# Patient Record
Sex: MALE | Race: WHITE | NOT HISPANIC OR LATINO | Employment: FULL TIME | URBAN - METROPOLITAN AREA
[De-identification: names, ages, dates, MRNs, and addresses within clinical notes are randomized per-mention and may not be internally consistent; named-entity substitution may affect disease eponyms.]

---

## 2024-08-13 ENCOUNTER — OFFICE VISIT (OUTPATIENT)
Dept: URGENT CARE | Facility: CLINIC | Age: 29
End: 2024-08-13
Payer: COMMERCIAL

## 2024-08-13 VITALS
OXYGEN SATURATION: 97 % | SYSTOLIC BLOOD PRESSURE: 120 MMHG | TEMPERATURE: 98 F | BODY MASS INDEX: 27.54 KG/M2 | DIASTOLIC BLOOD PRESSURE: 65 MMHG | HEART RATE: 45 BPM | WEIGHT: 238 LBS | RESPIRATION RATE: 18 BRPM | HEIGHT: 78 IN

## 2024-08-13 DIAGNOSIS — K52.9 GASTROENTERITIS: Primary | ICD-10-CM

## 2024-08-13 DIAGNOSIS — R19.7 DIARRHEA, UNSPECIFIED TYPE: ICD-10-CM

## 2024-08-13 DIAGNOSIS — R11.0 NAUSEA: ICD-10-CM

## 2024-08-13 PROCEDURE — 99203 OFFICE O/P NEW LOW 30 MIN: CPT | Mod: S$GLB,,,

## 2024-08-13 RX ORDER — ONDANSETRON 4 MG/1
4 TABLET, ORALLY DISINTEGRATING ORAL EVERY 6 HOURS PRN
Qty: 16 TABLET | Refills: 0 | Status: SHIPPED | OUTPATIENT
Start: 2024-08-13 | End: 2024-08-13

## 2024-08-13 RX ORDER — FAMOTIDINE 20 MG/1
20 TABLET, FILM COATED ORAL 2 TIMES DAILY
Qty: 14 TABLET | Refills: 0 | Status: SHIPPED | OUTPATIENT
Start: 2024-08-13 | End: 2024-08-13

## 2024-08-13 RX ORDER — ONDANSETRON 4 MG/1
4 TABLET, ORALLY DISINTEGRATING ORAL EVERY 6 HOURS PRN
Qty: 16 TABLET | Refills: 0 | Status: SHIPPED | OUTPATIENT
Start: 2024-08-13 | End: 2024-08-17

## 2024-08-13 RX ORDER — LORATADINE 10 MG/1
10 TABLET ORAL
COMMUNITY

## 2024-08-13 RX ORDER — FAMOTIDINE 20 MG/1
20 TABLET, FILM COATED ORAL 2 TIMES DAILY
Qty: 14 TABLET | Refills: 0 | Status: SHIPPED | OUTPATIENT
Start: 2024-08-13 | End: 2024-08-20

## 2024-08-13 NOTE — PATIENT INSTRUCTIONS
Your symptoms are suspicious for a viral gastroenteritis.     Our goal is to help you maintain hydration using anti-nausea and vomiting medications.  Please also take over the counter Pepto as directed on bottle until symptoms resolve.      You can use ondansetron/Zofran up to 8 mg every 8 hours as needed.        I do recommend starting oral Pepcid for a few days.  This would be over-the-counter famotidine/Pepcid at 20 mg every 12 hours for the next few days.       You can also use Imodium over-the-counter as needed for diarrhea as long as you do not have any significant abdominal pain or blood in his stool or fever.     If you are keeping liquids down any want to advance your diet to solids I would recommend bland solids- breads, rice, broth soups, etc.  Do not eat spicy or greasy foods and avoid caffeine and alcohol as this will make your symptoms worse.    If you experience any sharp abdominal pain, increased vomiting, dehydration, or fever please return to urgent care of the emergency department.     - You must understand that you have received an Urgent Care treatment only and that you may be released before all of your medical problems are known or treated.   - You, the patient, will arrange for follow up care as instructed.   - If your condition worsens or fails to improve we recommend that you receive another evaluation at the ER immediately or contact your PCP to discuss your concerns or return here.   - Follow up with your PCP or specialty clinic as directed in the next 1-2 weeks if not improved or as needed.  You can call [phone] to schedule an appointment with the appropriate provider.      If your symptoms do not improve or worsen, go to the emergency room immediately.

## 2024-08-13 NOTE — PROGRESS NOTES
"Subjective:      Patient ID: Amos Barber is a 29 y.o. male.    Vitals:  height is 6' 6" (1.981 m) and weight is 108 kg (238 lb). His temperature is 97.7 °F (36.5 °C). His blood pressure is 120/65 and his pulse is 45 (abnormal). His respiration is 18 and oxygen saturation is 97%.     Chief Complaint: Diarrhea    Pt here for diarrhea, nausea and fatigue onset 4-5 days ago. Pt sts he feels a burning sensation in his throat. Pt sts the diarrhea worsened the last 2 days. Pt sts he had a friend with the same symptoms that got tested and diagnosed with "some type of stomach virus". Pt sts he has not taken anything for relief. Pt sts he has been able to eat and drink fluids fine but feels like he is losing weight from the amount he is going.  Reports 3-4 episodes of NB loose bowel movements per day, no vomiting, but intermittent nausea. Denies any fever or abdominal cramping.    Diarrhea   This is a new problem. The current episode started in the past 7 days. The problem occurs 2 to 4 times per day. The problem has been unchanged. The stool consistency is described as Watery. The patient states that diarrhea awakens him from sleep. Associated symptoms include sweats. Pertinent negatives include no abdominal pain, arthralgias, bloating, chills, coughing, fever, headaches, increased  flatus, myalgias, URI, vomiting or weight loss. Nothing aggravates the symptoms. He has tried increased fluids for the symptoms. The treatment provided no relief. There is no history of bowel resection, inflammatory bowel disease, irritable bowel syndrome, malabsorption, a recent abdominal surgery or short gut syndrome.       Constitution: Negative for chills, fever and generalized weakness.   HENT:  Negative for ear pain, sinus pain and sore throat.    Neck: Negative for neck pain.   Cardiovascular:  Negative for chest pain.   Respiratory:  Negative for cough and shortness of breath.    Gastrointestinal:  Positive for nausea and " diarrhea. Negative for abdominal pain, vomiting, bright red blood in stool, dark colored stools and rectal bleeding.   Musculoskeletal:  Negative for joint pain and muscle ache.   Neurological:  Negative for headaches.      Objective:     Physical Exam   Constitutional: He is oriented to person, place, and time. He appears well-developed.   HENT:   Head: Normocephalic and atraumatic.   Ears:   Right Ear: External ear normal.   Left Ear: External ear normal.   Nose: Nose normal.   Mouth/Throat: Mucous membranes are normal.   Eyes: Conjunctivae and lids are normal.   Neck: Trachea normal. Neck supple.   Cardiovascular: Normal rate, regular rhythm and normal heart sounds.   Pulmonary/Chest: Effort normal and breath sounds normal. No respiratory distress.   Abdominal: Normal appearance and bowel sounds are normal. He exhibits no distension and no mass. Soft. There is no abdominal tenderness. There is no rebound, no guarding, no left CVA tenderness and no right CVA tenderness.      Comments: Soft, non-tender to palpation.     Musculoskeletal: Normal range of motion.         General: Normal range of motion.   Neurological: He is alert and oriented to person, place, and time. He has normal strength.   Skin: Skin is warm, dry, intact, not diaphoretic and not pale.   Psychiatric: His speech is normal and behavior is normal. Judgment and thought content normal.   Nursing note and vitals reviewed.      Assessment:     1. Gastroenteritis    2. Nausea    3. Diarrhea, unspecified type        Plan:   Patient is very well-appearing, alert, no acute distress, VSS, afebrile without recent antipyretic, and appears well-hydrated.  Physical exam as described above.  No history or exam finding concerning for appendicitis, pancreatitis, cholecystitis, diverticulitis, bacterial gastroenteritis, renal calculi, testicular torsion, or acute abdomen.  Abdomen soft and non-tender, no guarding or rebound tenderness, no CVA tenderness.    Tolerating PO intake, no concern for dehydration or hypoglycemia. Suspect viral gastroenteritis as cause of symptoms. Thoroughly reviewed strict return precautions and supportive care; patient verbalizes understanding and agrees to follow-up with PCP in 1-2 days as needed.     Gastroenteritis  -     Discontinue: ondansetron (ZOFRAN-ODT) 4 MG TbDL; Take 1 tablet (4 mg total) by mouth every 6 (six) hours as needed.  Dispense: 16 tablet; Refill: 0  -     Discontinue: famotidine (PEPCID) 20 MG tablet; Take 1 tablet (20 mg total) by mouth 2 (two) times daily. for 7 days  Dispense: 14 tablet; Refill: 0  -     famotidine (PEPCID) 20 MG tablet; Take 1 tablet (20 mg total) by mouth 2 (two) times daily. for 7 days  Dispense: 14 tablet; Refill: 0  -     ondansetron (ZOFRAN-ODT) 4 MG TbDL; Take 1 tablet (4 mg total) by mouth every 6 (six) hours as needed.  Dispense: 16 tablet; Refill: 0    Nausea  -     Discontinue: ondansetron (ZOFRAN-ODT) 4 MG TbDL; Take 1 tablet (4 mg total) by mouth every 6 (six) hours as needed.  Dispense: 16 tablet; Refill: 0  -     Discontinue: famotidine (PEPCID) 20 MG tablet; Take 1 tablet (20 mg total) by mouth 2 (two) times daily. for 7 days  Dispense: 14 tablet; Refill: 0  -     famotidine (PEPCID) 20 MG tablet; Take 1 tablet (20 mg total) by mouth 2 (two) times daily. for 7 days  Dispense: 14 tablet; Refill: 0  -     ondansetron (ZOFRAN-ODT) 4 MG TbDL; Take 1 tablet (4 mg total) by mouth every 6 (six) hours as needed.  Dispense: 16 tablet; Refill: 0    Diarrhea, unspecified type